# Patient Record
Sex: FEMALE
[De-identification: names, ages, dates, MRNs, and addresses within clinical notes are randomized per-mention and may not be internally consistent; named-entity substitution may affect disease eponyms.]

---

## 2021-02-10 PROBLEM — Z00.00 ENCOUNTER FOR PREVENTIVE HEALTH EXAMINATION: Status: ACTIVE | Noted: 2021-02-10

## 2021-03-05 ENCOUNTER — APPOINTMENT (OUTPATIENT)
Dept: PEDIATRIC ALLERGY IMMUNOLOGY | Facility: CLINIC | Age: 26
End: 2021-03-05
Payer: COMMERCIAL

## 2021-03-05 VITALS
HEIGHT: 62 IN | BODY MASS INDEX: 24.2 KG/M2 | WEIGHT: 131.5 LBS | SYSTOLIC BLOOD PRESSURE: 110 MMHG | TEMPERATURE: 98.1 F | DIASTOLIC BLOOD PRESSURE: 62 MMHG

## 2021-03-05 PROCEDURE — 99072 ADDL SUPL MATRL&STAF TM PHE: CPT

## 2021-03-05 PROCEDURE — 99203 OFFICE O/P NEW LOW 30 MIN: CPT

## 2021-03-05 NOTE — PHYSICAL EXAM

## 2021-03-05 NOTE — REASON FOR VISIT
[Initial Evaluation] : an initial evaluation of [Rash] : rash [Hives] : hives [Other: _____] : [unfilled] [FreeTextEntry3] : pt has been getting red rashes all over body since late December 2020, itchiness and sometimes it gets worse when itchy

## 2021-03-05 NOTE — SOCIAL HISTORY
[Apartment] : [unfilled] lives in an apartment  [Central Forced Air] : heating provided by central forced air [Window Units] : air conditioning provided by window units [None] : none [Single] : single [FreeTextEntry2] :   [Dust Mite Covers] : does not have dust mite covers [Feather Pillows] : does not have feather pillows [Feather Comforter] : does not have a feather comforter [Bedroom] : not in the bedroom [Basement] : not in the basement [Living Area] : not in the living area [Smokers in Household] : there are no smokers in the home

## 2021-03-05 NOTE — HISTORY OF PRESENT ILLNESS
[Skin] : skin [de-identified] : MIRANDA BOONE is a 25 year yo female w/ "hives" which started in Dec. She noted that her whole body got itchy and when she got scratched it it got raised. After a week or so she went to PCP and she was given cetirizine 10mg and benedryl at night. The spots come and go and it can come at almost any time. She had a mild improvement over the 2 weeks,  now she gets it about 2-3 times a week, but if she ignores it it disappears quickly. She had 1 previous episode in the summer that lasted for around 2 days.  [de-identified] : since late December 2020, on and off  [de-identified] : unknown  [de-identified] : caladryl clear  [de-identified] : pt still gets small flare ups 3x a week, for a couple of hours  [de-identified] : rashes all over body, itchiness and burning sensation

## 2021-03-24 ENCOUNTER — APPOINTMENT (OUTPATIENT)
Dept: PEDIATRIC ALLERGY IMMUNOLOGY | Facility: CLINIC | Age: 26
End: 2021-03-24
Payer: COMMERCIAL

## 2021-03-24 VITALS
TEMPERATURE: 97.9 F | DIASTOLIC BLOOD PRESSURE: 60 MMHG | SYSTOLIC BLOOD PRESSURE: 110 MMHG | WEIGHT: 131 LBS | HEIGHT: 62 IN | BODY MASS INDEX: 24.11 KG/M2

## 2021-03-24 PROCEDURE — 99072 ADDL SUPL MATRL&STAF TM PHE: CPT

## 2021-03-24 PROCEDURE — 99213 OFFICE O/P EST LOW 20 MIN: CPT

## 2021-03-24 NOTE — PHYSICAL EXAM

## 2021-03-24 NOTE — REASON FOR VISIT
[Routine Follow-Up] : a routine follow-up visit for [FreeTextEntry3] : pt states medication prescribed on last visit has been helping her feel better, she hasn’t had a rash since then

## 2021-03-24 NOTE — HISTORY OF PRESENT ILLNESS
[Skin] : skin [de-identified] : MIRANDA BOONE is a 25 year yo female w/ "hives" which started in Dec. She noted that her whole body got itchy and when she got scratched it it got raised. After a week or so she went to PCP and she was given cetirizine 10mg and benedryl at night. The spots come and go and it can come at almost any time. She had a mild improvement over the 2 weeks,  now she gets it about 2-3 times a week, but if she ignores it it disappears quickly. She had 1 previous episode in the summer that lasted for around 2 days. \par \par She is doing well with medications, no side effects.  [de-identified] : since late December 2020, on and off  [de-identified] : unknown  [de-identified] : caladryl clear  [de-identified] : pt still gets small flare ups 3x a week, for a couple of hours  [de-identified] : rashes all over body, itchiness and burning sensation

## 2021-04-21 ENCOUNTER — APPOINTMENT (OUTPATIENT)
Dept: PEDIATRIC ALLERGY IMMUNOLOGY | Facility: CLINIC | Age: 26
End: 2021-04-21
Payer: COMMERCIAL

## 2021-04-21 VITALS
HEIGHT: 62 IN | BODY MASS INDEX: 24.11 KG/M2 | WEIGHT: 131 LBS | DIASTOLIC BLOOD PRESSURE: 68 MMHG | TEMPERATURE: 98.2 F | SYSTOLIC BLOOD PRESSURE: 110 MMHG

## 2021-04-21 PROCEDURE — 99072 ADDL SUPL MATRL&STAF TM PHE: CPT

## 2021-04-21 PROCEDURE — 99213 OFFICE O/P EST LOW 20 MIN: CPT

## 2021-04-21 NOTE — PHYSICAL EXAM
[Alert] : alert [Well Nourished] : well nourished [Healthy Appearance] : healthy appearance [No Acute Distress] : no acute distress [Well Developed] : well developed [Normal Pupil & Iris Size/Symmetry] : normal pupil and iris size and symmetry [No Discharge] : no discharge [No Photophobia] : no photophobia [Sclera Not Icteric] : sclera not icteric [Normal TMs] : both tympanic membranes were normal [Normal Nasal Mucosa] : the nasal mucosa was normal [Normal Lips/Tongue] : the lips and tongue were normal [Normal Outer Ear/Nose] : the ears and nose were normal in appearance [Normal Tonsils] : normal tonsils [No Thrush] : no thrush [Supple] : the neck was supple [Normal Rate and Effort] : normal respiratory rhythm and effort [No Crackles] : no crackles [No Retractions] : no retractions [Bilateral Audible Breath Sounds] : bilateral audible breath sounds [Normal Rate] : heart rate was normal  [Normal S1, S2] : normal S1 and S2 [No murmur] : no murmur [Regular Rhythm] : with a regular rhythm [Soft] : abdomen soft [Not Tender] : non-tender [Not Distended] : not distended [No HSM] : no hepato-splenomegaly [Normal Cervical Lymph Nodes] : cervical [Skin Intact] : skin intact  [No Rash] : no rash [No Skin Lesions] : no skin lesions [No clubbing] : no clubbing [No Edema] : no edema [No Cyanosis] : no cyanosis [Normal Mood] : mood was normal [Normal Affect] : affect was normal [Alert, Awake, Oriented as Age-Appropriate] : alert, awake, oriented as age appropriate [Pale mucosa] : no pale mucosa [de-identified] : Dermatographism

## 2021-04-21 NOTE — REASON FOR VISIT
[Routine Follow-Up] : a routine follow-up visit for [Other: _____] : [unfilled] [FreeTextEntry3] : pt states she has been feeling better with the medications prescribed last visit, there's still some itchiness sometimes

## 2021-04-21 NOTE — HISTORY OF PRESENT ILLNESS
[Skin] : skin [de-identified] : MIRANDA BOONE is a 25 year yo female w/ "hives" which started in Dec. She noted that her whole body got itchy and when she got scratched it it got raised. After a week or so she went to PCP and she was given cetirizine 10mg and benedryl at night. The spots come and go and it can come at almost any time. She had a mild improvement over the 2 weeks,  now she gets it about 2-3 times a week, but if she ignores it it disappears quickly. She had 1 previous episode in the summer that lasted for around 2 days. \par \par She skipped her medications on monday, and she had very mild symptoms. [de-identified] : since late December 2020, on and off  [de-identified] : unknown  [de-identified] : caladryl clear  [de-identified] : pt still gets small flare ups 3x a week, for a couple of hours  [de-identified] : rashes all over body, itchiness and burning sensation

## 2021-06-02 ENCOUNTER — APPOINTMENT (OUTPATIENT)
Dept: PEDIATRIC ALLERGY IMMUNOLOGY | Facility: CLINIC | Age: 26
End: 2021-06-02
Payer: COMMERCIAL

## 2021-06-02 VITALS
DIASTOLIC BLOOD PRESSURE: 68 MMHG | BODY MASS INDEX: 25.21 KG/M2 | WEIGHT: 137 LBS | HEIGHT: 62 IN | TEMPERATURE: 97.8 F | SYSTOLIC BLOOD PRESSURE: 110 MMHG

## 2021-06-02 PROCEDURE — 99213 OFFICE O/P EST LOW 20 MIN: CPT

## 2021-06-02 PROCEDURE — 99072 ADDL SUPL MATRL&STAF TM PHE: CPT

## 2021-06-02 NOTE — REASON FOR VISIT
[Routine Follow-Up] : a routine follow-up visit for [Other: _____] : [unfilled] [FreeTextEntry3] : pt states she doesn’t get hives and itchiness while on medications, she did forget to take medications 2x and she got hives.

## 2021-06-02 NOTE — HISTORY OF PRESENT ILLNESS
[Skin] : skin [de-identified] : MIRANDA BOONE is a 25 year yo female w/ "hives" which started in Dec. She noted that her whole body got itchy and when she got scratched it it got raised. After a week or so she went to PCP and she was given cetirizine 10mg and benedryl at night. The spots come and go and it can come at almost any time. She had a mild improvement over the 2 weeks,  now she gets it about 2-3 times a week, but if she ignores it it disappears quickly. She had 1 previous episode in the summer that lasted for around 2 days. \par \par She has been hive free except for 2 days where she missed her medication. [de-identified] : since late December 2020, on and off  [de-identified] : unknown  [de-identified] : caladryl clear  [de-identified] : pt still gets small flare ups 3x a week, for a couple of hours  [de-identified] : rashes all over body, itchiness and burning sensation

## 2021-06-02 NOTE — PHYSICAL EXAM
[Alert] : alert [Well Nourished] : well nourished [Healthy Appearance] : healthy appearance [No Acute Distress] : no acute distress [Well Developed] : well developed [Normal Pupil & Iris Size/Symmetry] : normal pupil and iris size and symmetry [No Discharge] : no discharge [No Photophobia] : no photophobia [Sclera Not Icteric] : sclera not icteric [Normal TMs] : both tympanic membranes were normal [Normal Nasal Mucosa] : the nasal mucosa was normal [Normal Lips/Tongue] : the lips and tongue were normal [Normal Outer Ear/Nose] : the ears and nose were normal in appearance [Normal Tonsils] : normal tonsils [No Thrush] : no thrush [Supple] : the neck was supple [Normal Rate and Effort] : normal respiratory rhythm and effort [No Crackles] : no crackles [No Retractions] : no retractions [Bilateral Audible Breath Sounds] : bilateral audible breath sounds [Normal Rate] : heart rate was normal  [Normal S1, S2] : normal S1 and S2 [No murmur] : no murmur [Regular Rhythm] : with a regular rhythm [Soft] : abdomen soft [Not Tender] : non-tender [Not Distended] : not distended [No HSM] : no hepato-splenomegaly [Normal Cervical Lymph Nodes] : cervical [Skin Intact] : skin intact  [No Rash] : no rash [No Skin Lesions] : no skin lesions [No clubbing] : no clubbing [No Edema] : no edema [No Cyanosis] : no cyanosis [Normal Mood] : mood was normal [Normal Affect] : affect was normal [Alert, Awake, Oriented as Age-Appropriate] : alert, awake, oriented as age appropriate [Pale mucosa] : no pale mucosa [de-identified] : Dermatographism

## 2021-08-18 ENCOUNTER — APPOINTMENT (OUTPATIENT)
Dept: PEDIATRIC ALLERGY IMMUNOLOGY | Facility: CLINIC | Age: 26
End: 2021-08-18

## 2021-09-22 ENCOUNTER — RX RENEWAL (OUTPATIENT)
Age: 26
End: 2021-09-22

## 2022-01-26 ENCOUNTER — RX RENEWAL (OUTPATIENT)
Age: 27
End: 2022-01-26

## 2022-03-25 ENCOUNTER — APPOINTMENT (OUTPATIENT)
Dept: PEDIATRIC ALLERGY IMMUNOLOGY | Facility: CLINIC | Age: 27
End: 2022-03-25
Payer: COMMERCIAL

## 2022-03-25 VITALS
HEIGHT: 62 IN | WEIGHT: 137 LBS | DIASTOLIC BLOOD PRESSURE: 62 MMHG | BODY MASS INDEX: 25.21 KG/M2 | SYSTOLIC BLOOD PRESSURE: 108 MMHG

## 2022-03-25 PROCEDURE — 99214 OFFICE O/P EST MOD 30 MIN: CPT

## 2022-03-25 NOTE — ASSESSMENT
[FreeTextEntry1] : 1. CU- Cetirizine 10mg, famotodine 20mg bid and montelukast 10mg. - Will get autoimmune work up.\par \par

## 2022-03-25 NOTE — HISTORY OF PRESENT ILLNESS
[None] : The patient is currently asymptomatic [de-identified] : MIRANDA BOONE is a 26 year female  with a history of hives. Patient states she has been doing ok, She gets hives if she forgets to take her medications. No episodes of hives in the past 2 weeks. She is on Montelukast 10 mg, Cetirizine 10 mg and Famotidine 20 mg with no issues. While on the meds she does not get hives. She denies any side effects on the meds.

## 2022-03-25 NOTE — PHYSICAL EXAM

## 2022-04-29 ENCOUNTER — APPOINTMENT (OUTPATIENT)
Dept: PEDIATRIC ALLERGY IMMUNOLOGY | Facility: CLINIC | Age: 27
End: 2022-04-29

## 2022-06-20 ENCOUNTER — RX RENEWAL (OUTPATIENT)
Age: 27
End: 2022-06-20

## 2022-09-13 ENCOUNTER — RX RENEWAL (OUTPATIENT)
Age: 27
End: 2022-09-13

## 2022-12-16 ENCOUNTER — APPOINTMENT (OUTPATIENT)
Dept: PEDIATRIC ALLERGY IMMUNOLOGY | Facility: CLINIC | Age: 27
End: 2022-12-16

## 2022-12-16 VITALS — BODY MASS INDEX: 26.13 KG/M2 | HEIGHT: 62 IN | WEIGHT: 142 LBS

## 2022-12-16 PROCEDURE — 99213 OFFICE O/P EST LOW 20 MIN: CPT

## 2022-12-16 RX ORDER — CETIRIZINE HYDROCHLORIDE 10 MG/1
10 TABLET, COATED ORAL DAILY
Qty: 90 | Refills: 1 | Status: ACTIVE | COMMUNITY
Start: 2021-03-05 | End: 1900-01-01

## 2022-12-16 NOTE — ASSESSMENT
[FreeTextEntry1] : 1. [facility] - Cetirizine 10mg, famotodine 20mg bid and montelukast 10mg. - Autoimmune work up negative\par \par

## 2022-12-16 NOTE — HISTORY OF PRESENT ILLNESS
[None] : The patient is currently asymptomatic [de-identified] : MIRANDA BOONE is a 27 year female  with a history of hives. Patient states she has been doing well, she has not had any recent episodes of hives. When she forgets to take her medications she feels itchy. She is on Montelukast 10 mg, Famotidine 20 mg, and Cetirizine 10 mg. \par \par She denies issues with the medications, but still breaks out when she stops her medicine.

## 2022-12-16 NOTE — PHYSICAL EXAM
[Alert] : alert [Well Nourished] : well nourished [Healthy Appearance] : healthy appearance [No Acute Distress] : no acute distress [Well Developed] : well developed [Normal Pupil & Iris Size/Symmetry] : normal pupil and iris size and symmetry [No Discharge] : no discharge [No Photophobia] : no photophobia [Sclera Not Icteric] : sclera not icteric [Normal TMs] : both tympanic membranes were normal [Normal Nasal Mucosa] : the nasal mucosa was normal [Normal Lips/Tongue] : the lips and tongue were normal [Normal Outer Ear/Nose] : the ears and nose were normal in appearance [No Nasal Discharge] : no nasal discharge [Normal Tonsils] : normal tonsils [Supple] : the neck was supple [Normal Rate and Effort] : normal respiratory rhythm and effort [No Crackles] : no crackles [No Retractions] : no retractions [Bilateral Audible Breath Sounds] : bilateral audible breath sounds [Normal Rate] : heart rate was normal  [Normal S1, S2] : normal S1 and S2 [No murmur] : no murmur [Regular Rhythm] : with a regular rhythm [Soft] : abdomen soft [Not Tender] : non-tender [Not Distended] : not distended [No HSM] : no hepato-splenomegaly [Normal Cervical Lymph Nodes] : cervical [Skin Intact] : skin intact  [No Rash] : no rash [No Skin Lesions] : no skin lesions [No clubbing] : no clubbing [No Edema] : no edema [No Cyanosis] : no cyanosis [Normal Mood] : mood was normal [Normal Affect] : affect was normal [Alert, Awake, Oriented as Age-Appropriate] : alert, awake, oriented as age appropriate [de-identified] : Dermatographism

## 2023-02-10 ENCOUNTER — RX RENEWAL (OUTPATIENT)
Age: 28
End: 2023-02-10

## 2023-03-15 ENCOUNTER — RX RENEWAL (OUTPATIENT)
Age: 28
End: 2023-03-15

## 2023-05-12 ENCOUNTER — RX RENEWAL (OUTPATIENT)
Age: 28
End: 2023-05-12

## 2023-06-16 ENCOUNTER — APPOINTMENT (OUTPATIENT)
Dept: PEDIATRIC ALLERGY IMMUNOLOGY | Facility: CLINIC | Age: 28
End: 2023-06-16
Payer: COMMERCIAL

## 2023-06-16 VITALS
BODY MASS INDEX: 27.97 KG/M2 | DIASTOLIC BLOOD PRESSURE: 80 MMHG | SYSTOLIC BLOOD PRESSURE: 120 MMHG | HEIGHT: 62 IN | TEMPERATURE: 97.1 F | WEIGHT: 152 LBS

## 2023-06-16 PROCEDURE — 99214 OFFICE O/P EST MOD 30 MIN: CPT

## 2023-06-16 RX ORDER — FAMOTIDINE 20 MG/1
20 TABLET, FILM COATED ORAL
Qty: 180 | Refills: 1 | Status: ACTIVE | COMMUNITY
Start: 2021-03-05 | End: 1900-01-01

## 2023-06-16 NOTE — HISTORY OF PRESENT ILLNESS
[de-identified] : MIRANDA BOONE is a 27 year female with a history of hives. Patient states she has been doing well, she has not had any recent episodes of hives. When she forgets to take her medications she feels itchy. She is on Montelukast 10 mg, Famotidine 20 mg, and Cetirizine 10 mg. \par \par She denies issues with the medications, but still breaks out when she stops her medicine. \par \par Current Asthma Symptoms: The patient is currently asymptomatic. \par \par She is here today for a follow up no new changes no new or worsening signs or symptoms\par  \par

## 2023-06-16 NOTE — PHYSICAL EXAM
[Alert] : alert [Well Nourished] : well nourished [Healthy Appearance] : healthy appearance [No Acute Distress] : no acute distress [Well Developed] : well developed [Normal Pupil & Iris Size/Symmetry] : normal pupil and iris size and symmetry [No Discharge] : no discharge [Sclera Not Icteric] : sclera not icteric [No Photophobia] : no photophobia [Normal TMs] : both tympanic membranes were normal [Normal Nasal Mucosa] : the nasal mucosa was normal [Normal Lips/Tongue] : the lips and tongue were normal [Normal Outer Ear/Nose] : the ears and nose were normal in appearance [No Nasal Discharge] : no nasal discharge [Normal Tonsils] : normal tonsils [Supple] : the neck was supple [Normal Rate and Effort] : normal respiratory rhythm and effort [No Crackles] : no crackles [No Retractions] : no retractions [Bilateral Audible Breath Sounds] : bilateral audible breath sounds [Normal Rate] : heart rate was normal  [Normal S1, S2] : normal S1 and S2 [No murmur] : no murmur [Regular Rhythm] : with a regular rhythm [Soft] : abdomen soft [Not Tender] : non-tender [Not Distended] : not distended [No HSM] : no hepato-splenomegaly [Normal Cervical Lymph Nodes] : cervical [Skin Intact] : skin intact  [No Rash] : no rash [No Skin Lesions] : no skin lesions [No clubbing] : no clubbing [No Edema] : no edema [No Cyanosis] : no cyanosis [Normal Mood] : mood was normal [Normal Affect] : affect was normal [Alert, Awake, Oriented as Age-Appropriate] : alert, awake, oriented as age appropriate [de-identified] : Dermatographism

## 2023-09-20 ENCOUNTER — APPOINTMENT (OUTPATIENT)
Dept: PEDIATRIC ALLERGY IMMUNOLOGY | Facility: CLINIC | Age: 28
End: 2023-09-20

## 2024-03-04 ENCOUNTER — RX RENEWAL (OUTPATIENT)
Age: 29
End: 2024-03-04

## 2024-03-27 ENCOUNTER — APPOINTMENT (OUTPATIENT)
Dept: PEDIATRIC ALLERGY IMMUNOLOGY | Facility: CLINIC | Age: 29
End: 2024-03-27
Payer: COMMERCIAL

## 2024-03-27 VITALS
WEIGHT: 150 LBS | HEIGHT: 62 IN | DIASTOLIC BLOOD PRESSURE: 70 MMHG | BODY MASS INDEX: 27.6 KG/M2 | SYSTOLIC BLOOD PRESSURE: 120 MMHG

## 2024-03-27 DIAGNOSIS — L50.8 OTHER URTICARIA: ICD-10-CM

## 2024-03-27 PROCEDURE — 99213 OFFICE O/P EST LOW 20 MIN: CPT

## 2024-03-27 RX ORDER — MONTELUKAST 10 MG/1
10 TABLET, FILM COATED ORAL
Qty: 90 | Refills: 1 | Status: ACTIVE | COMMUNITY
Start: 2021-03-05 | End: 1900-01-01

## 2024-03-27 NOTE — HISTORY OF PRESENT ILLNESS
[de-identified] : MIRANDA BOONE is a 28 year female with a history of hives. Patient states she has been doing well, she has not had any recent episodes of hives. When she forgets to take her medications she feels itchy. She is on Montelukast 10 mg, Famotidine 20 mg, and Cetirizine 10 mg. She denies issues with the medications, but still breaks out when she stops her medicine.  She is here today for a follow up, she says there have been no new changes she is still taking her medication, she has not had a flare up since September, she has noticed that as long as she takes her medication, she has no flare ups, she wants to know what her next steps are.  .

## 2024-03-27 NOTE — PHYSICAL EXAM
[Alert] : alert [Well Nourished] : well nourished [Healthy Appearance] : healthy appearance [No Acute Distress] : no acute distress [Well Developed] : well developed [Normal Pupil & Iris Size/Symmetry] : normal pupil and iris size and symmetry [No Discharge] : no discharge [No Photophobia] : no photophobia [Sclera Not Icteric] : sclera not icteric [Normal Nasal Mucosa] : the nasal mucosa was normal [Normal TMs] : both tympanic membranes were normal [Normal Lips/Tongue] : the lips and tongue were normal [Normal Outer Ear/Nose] : the ears and nose were normal in appearance [No Nasal Discharge] : no nasal discharge [Normal Tonsils] : normal tonsils [Normal Rate and Effort] : normal respiratory rhythm and effort [Supple] : the neck was supple [No Crackles] : no crackles [Bilateral Audible Breath Sounds] : bilateral audible breath sounds [No Retractions] : no retractions [Normal Rate] : heart rate was normal  [Normal S1, S2] : normal S1 and S2 [No murmur] : no murmur [Regular Rhythm] : with a regular rhythm [Not Tender] : non-tender [Soft] : abdomen soft [Not Distended] : not distended [No HSM] : no hepato-splenomegaly [Normal Cervical Lymph Nodes] : cervical [Skin Intact] : skin intact  [No Rash] : no rash [No Skin Lesions] : no skin lesions [No clubbing] : no clubbing [No Edema] : no edema [No Cyanosis] : no cyanosis [Normal Mood] : mood was normal [Normal Affect] : affect was normal [Alert, Awake, Oriented as Age-Appropriate] : alert, awake, oriented as age appropriate [de-identified] : Dermatographism

## 2024-03-27 NOTE — ASSESSMENT
[FreeTextEntry1] : 1. CU - Cetirizine 10mg, montelukast 10mg (wean off as tolerated). - Autoimmune work up negative

## 2024-07-03 ENCOUNTER — APPOINTMENT (OUTPATIENT)
Dept: PEDIATRIC ALLERGY IMMUNOLOGY | Facility: CLINIC | Age: 29
End: 2024-07-03
Payer: COMMERCIAL

## 2024-07-03 VITALS
WEIGHT: 159 LBS | DIASTOLIC BLOOD PRESSURE: 60 MMHG | SYSTOLIC BLOOD PRESSURE: 110 MMHG | BODY MASS INDEX: 29.26 KG/M2 | HEIGHT: 62 IN

## 2024-07-03 DIAGNOSIS — L50.8 OTHER URTICARIA: ICD-10-CM

## 2024-07-03 PROCEDURE — 99213 OFFICE O/P EST LOW 20 MIN: CPT

## 2024-10-11 ENCOUNTER — APPOINTMENT (OUTPATIENT)
Dept: PEDIATRIC ALLERGY IMMUNOLOGY | Facility: CLINIC | Age: 29
End: 2024-10-11